# Patient Record
Sex: FEMALE | Race: BLACK OR AFRICAN AMERICAN | NOT HISPANIC OR LATINO | URBAN - METROPOLITAN AREA
[De-identification: names, ages, dates, MRNs, and addresses within clinical notes are randomized per-mention and may not be internally consistent; named-entity substitution may affect disease eponyms.]

---
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
## 2021-12-13 NOTE — PHYSICAL EXAM
Left detailed message for patient.  Advised only needed to call back if additional questions or concerns.    Dizziness Dizziness [No Acute Distress] : no acute distress Dizziness [Alert] : alert Dizziness [Normal External Genitalia] : normal external genitalia [FreeTextEntry6] : ingrown hair in L pubic area; no other  lesions noted; no excoriations; speculum exam with normal cervix and vaginal walls; +homogeneous white-light yellow discharge Dizziness Dizziness

## 2022-03-02 ENCOUNTER — RESULT REVIEW (OUTPATIENT)
Age: 22
End: 2022-03-02